# Patient Record
Sex: MALE | Race: WHITE | Employment: FULL TIME | URBAN - METROPOLITAN AREA
[De-identification: names, ages, dates, MRNs, and addresses within clinical notes are randomized per-mention and may not be internally consistent; named-entity substitution may affect disease eponyms.]

---

## 2018-08-28 ENCOUNTER — HOSPITAL ENCOUNTER (OUTPATIENT)
Dept: LAB | Age: 58
Discharge: HOME | End: 2018-08-28
Payer: COMMERCIAL

## 2018-08-28 DIAGNOSIS — R07.9 CHEST PAIN, UNSPECIFIED TYPE: ICD-10-CM

## 2018-08-28 LAB
ANION GAP SERPL CALC-SCNC: 14 MMOL/L
BASOPHILS # BLD: 0.1 K/UL (ref 0–0.2)
BASOPHILS NFR BLD: 1 % (ref 0–2)
BUN SERPL-MCNC: 20 MG/DL (ref 6–23)
CALCIUM SERPL-MCNC: 9.5 MG/DL (ref 8.3–10.4)
CHLORIDE SERPL-SCNC: 97 MMOL/L (ref 98–107)
CO2 SERPL-SCNC: 24 MMOL/L (ref 21–32)
CREAT SERPL-MCNC: 1.1 MG/DL (ref 0.8–1.5)
DIFFERENTIAL METHOD BLD: NORMAL
EOSINOPHIL # BLD: 0.2 K/UL (ref 0–0.8)
EOSINOPHIL NFR BLD: 3 % (ref 0.5–7.8)
ERYTHROCYTE [DISTWIDTH] IN BLOOD BY AUTOMATED COUNT: 13.6 %
GLUCOSE SERPL-MCNC: 106 MG/DL (ref 65–100)
HCT VFR BLD AUTO: 43 % (ref 41.1–50.3)
HGB BLD-MCNC: 13.8 G/DL (ref 13.6–17.2)
IMM GRANULOCYTES # BLD: 0 K/UL (ref 0–0.5)
IMM GRANULOCYTES NFR BLD AUTO: 0 % (ref 0–5)
INR PPP: 1
LYMPHOCYTES # BLD: 2.4 K/UL (ref 0.5–4.6)
LYMPHOCYTES NFR BLD: 36 % (ref 13–44)
MCH RBC QN AUTO: 29.9 PG (ref 26.1–32.9)
MCHC RBC AUTO-ENTMCNC: 32.1 G/DL (ref 31.4–35)
MCV RBC AUTO: 93.1 FL (ref 79.6–97.8)
MONOCYTES # BLD: 0.7 K/UL (ref 0.1–1.3)
MONOCYTES NFR BLD: 11 % (ref 4–12)
NEUTS SEG # BLD: 3.2 K/UL (ref 1.7–8.2)
NEUTS SEG NFR BLD: 48 % (ref 43–78)
NRBC # BLD: 0 K/UL (ref 0–0.2)
PLATELET # BLD AUTO: 167 K/UL (ref 150–450)
PMV BLD AUTO: 10.4 FL (ref 9.4–12.3)
POTASSIUM SERPL-SCNC: 4.5 MMOL/L (ref 3.5–5.1)
PROTHROMBIN TIME: 12.9 SEC (ref 11.5–14.5)
RBC # BLD AUTO: 4.62 M/UL (ref 4.23–5.6)
SODIUM SERPL-SCNC: 135 MMOL/L (ref 136–145)
WBC # BLD AUTO: 6.6 K/UL (ref 4.3–11.1)

## 2018-08-28 PROCEDURE — 36415 COLL VENOUS BLD VENIPUNCTURE: CPT

## 2018-08-28 PROCEDURE — 85025 COMPLETE CBC W/AUTO DIFF WBC: CPT

## 2018-08-28 PROCEDURE — 85610 PROTHROMBIN TIME: CPT

## 2018-08-28 PROCEDURE — 80048 BASIC METABOLIC PNL TOTAL CA: CPT

## 2018-08-30 NOTE — PROGRESS NOTES
Patient pre-assessment complete for [facility] poss with DR Ferrel Alpers scheduled for 18 at 9:30am, arrival time 7:30am. Patient verified using . Patient instructed to bring all home medications in labeled bottles on the day of procedure. NPO status reinforced. Patient informed to take a full dose aspirin 325mg  or 81 mg x 4 on the day of procedure. Patient instructed to HOLD metformin (last dose 18) take 1/2 insulin tonight & NO insulin, metformin or HCTZ in am . Instructed they can take all other medications excluding vitamins & supplements. Patient verbalizes understanding of all instructions & denies any questions at this time.

## 2018-08-31 ENCOUNTER — HOSPITAL ENCOUNTER (OUTPATIENT)
Dept: CARDIAC CATH/INVASIVE PROCEDURES | Age: 58
Discharge: HOME | End: 2018-08-31
Attending: INTERNAL MEDICINE | Admitting: INTERNAL MEDICINE
Payer: COMMERCIAL

## 2018-08-31 VITALS
WEIGHT: 223 LBS | HEIGHT: 65 IN | OXYGEN SATURATION: 96 % | RESPIRATION RATE: 16 BRPM | HEART RATE: 68 BPM | BODY MASS INDEX: 37.15 KG/M2 | SYSTOLIC BLOOD PRESSURE: 131 MMHG | DIASTOLIC BLOOD PRESSURE: 68 MMHG

## 2018-08-31 LAB
ATRIAL RATE: 67 BPM
CALCULATED P AXIS, ECG09: 2 DEGREES
CALCULATED R AXIS, ECG10: -2 DEGREES
CALCULATED T AXIS, ECG11: 36 DEGREES
DIAGNOSIS, 93000: NORMAL
GLUCOSE BLD STRIP.AUTO-MCNC: 207 MG/DL (ref 65–100)
P-R INTERVAL, ECG05: 158 MS
Q-T INTERVAL, ECG07: 380 MS
QRS DURATION, ECG06: 90 MS
QTC CALCULATION (BEZET), ECG08: 401 MS
VENTRICULAR RATE, ECG03: 67 BPM

## 2018-08-31 PROCEDURE — 74011000250 HC RX REV CODE- 250: Performed by: INTERNAL MEDICINE

## 2018-08-31 PROCEDURE — 74011000258 HC RX REV CODE- 258: Performed by: INTERNAL MEDICINE

## 2018-08-31 PROCEDURE — 77030019569 HC BND COMPR RAD TERU -B

## 2018-08-31 PROCEDURE — 99152 MOD SED SAME PHYS/QHP 5/>YRS: CPT

## 2018-08-31 PROCEDURE — 74011636320 HC RX REV CODE- 636/320: Performed by: INTERNAL MEDICINE

## 2018-08-31 PROCEDURE — C1894 INTRO/SHEATH, NON-LASER: HCPCS

## 2018-08-31 PROCEDURE — C1887 CATHETER, GUIDING: HCPCS

## 2018-08-31 PROCEDURE — 74011250636 HC RX REV CODE- 250/636: Performed by: INTERNAL MEDICINE

## 2018-08-31 PROCEDURE — 93571 IV DOP VEL&/PRESS C FLO 1ST: CPT

## 2018-08-31 PROCEDURE — 93458 L HRT ARTERY/VENTRICLE ANGIO: CPT

## 2018-08-31 PROCEDURE — C1769 GUIDE WIRE: HCPCS

## 2018-08-31 PROCEDURE — 77030004558 HC CATH ANGI DX SUPR TORQ CARD -A

## 2018-08-31 PROCEDURE — 82962 GLUCOSE BLOOD TEST: CPT

## 2018-08-31 PROCEDURE — 77030004559 HC CATH ANGI DX SUPT CARD -B

## 2018-08-31 PROCEDURE — 93005 ELECTROCARDIOGRAM TRACING: CPT | Performed by: INTERNAL MEDICINE

## 2018-08-31 PROCEDURE — 99153 MOD SED SAME PHYS/QHP EA: CPT

## 2018-08-31 PROCEDURE — 74011250636 HC RX REV CODE- 250/636

## 2018-08-31 RX ORDER — HYDROCODONE BITARTRATE AND ACETAMINOPHEN 5; 325 MG/1; MG/1
1 TABLET ORAL
Status: CANCELLED | OUTPATIENT
Start: 2018-08-31

## 2018-08-31 RX ORDER — SODIUM CHLORIDE 9 MG/ML
75 INJECTION, SOLUTION INTRAVENOUS CONTINUOUS
Status: CANCELLED | OUTPATIENT
Start: 2018-08-31

## 2018-08-31 RX ORDER — FENTANYL CITRATE 50 UG/ML
25-100 INJECTION, SOLUTION INTRAMUSCULAR; INTRAVENOUS
Status: DISCONTINUED | OUTPATIENT
Start: 2018-08-31 | End: 2018-08-31

## 2018-08-31 RX ORDER — HEPARIN SODIUM 10000 [USP'U]/ML
1000-9000 INJECTION, SOLUTION INTRAVENOUS; SUBCUTANEOUS
Status: DISCONTINUED | OUTPATIENT
Start: 2018-08-31 | End: 2018-08-31

## 2018-08-31 RX ORDER — DIAZEPAM 5 MG/1
5 TABLET ORAL ONCE
Status: DISCONTINUED | OUTPATIENT
Start: 2018-08-31 | End: 2018-08-31 | Stop reason: HOSPADM

## 2018-08-31 RX ORDER — LIDOCAINE HYDROCHLORIDE 10 MG/ML
20-300 INJECTION INFILTRATION; PERINEURAL
Status: DISCONTINUED | OUTPATIENT
Start: 2018-08-31 | End: 2018-08-31

## 2018-08-31 RX ORDER — SODIUM CHLORIDE 0.9 % (FLUSH) 0.9 %
5-10 SYRINGE (ML) INJECTION EVERY 8 HOURS
Status: CANCELLED | OUTPATIENT
Start: 2018-08-31

## 2018-08-31 RX ORDER — GUAIFENESIN 100 MG/5ML
81-324 LIQUID (ML) ORAL ONCE
Status: DISCONTINUED | OUTPATIENT
Start: 2018-08-31 | End: 2018-08-31 | Stop reason: HOSPADM

## 2018-08-31 RX ORDER — ASPIRIN 325 MG
325 TABLET ORAL DAILY
COMMUNITY

## 2018-08-31 RX ORDER — NALOXONE HYDROCHLORIDE 0.4 MG/ML
0.4 INJECTION, SOLUTION INTRAMUSCULAR; INTRAVENOUS; SUBCUTANEOUS AS NEEDED
Status: CANCELLED | OUTPATIENT
Start: 2018-08-31

## 2018-08-31 RX ORDER — MORPHINE SULFATE 2 MG/ML
1 INJECTION, SOLUTION INTRAMUSCULAR; INTRAVENOUS
Status: CANCELLED | OUTPATIENT
Start: 2018-08-31

## 2018-08-31 RX ORDER — ACETAMINOPHEN 325 MG/1
650 TABLET ORAL
Status: CANCELLED | OUTPATIENT
Start: 2018-08-31

## 2018-08-31 RX ORDER — SODIUM CHLORIDE 0.9 % (FLUSH) 0.9 %
5-10 SYRINGE (ML) INJECTION AS NEEDED
Status: CANCELLED | OUTPATIENT
Start: 2018-08-31

## 2018-08-31 RX ORDER — ONDANSETRON 2 MG/ML
4 INJECTION INTRAMUSCULAR; INTRAVENOUS
Status: CANCELLED | OUTPATIENT
Start: 2018-08-31 | End: 2018-09-01

## 2018-08-31 RX ORDER — MIDAZOLAM HYDROCHLORIDE 1 MG/ML
.5-2 INJECTION, SOLUTION INTRAMUSCULAR; INTRAVENOUS
Status: DISCONTINUED | OUTPATIENT
Start: 2018-08-31 | End: 2018-08-31

## 2018-08-31 RX ORDER — SODIUM CHLORIDE 9 MG/ML
75 INJECTION, SOLUTION INTRAVENOUS CONTINUOUS
Status: DISCONTINUED | OUTPATIENT
Start: 2018-08-31 | End: 2018-08-31 | Stop reason: HOSPADM

## 2018-08-31 RX ORDER — HEPARIN SODIUM 200 [USP'U]/100ML
3 INJECTION, SOLUTION INTRAVENOUS CONTINUOUS
Status: DISCONTINUED | OUTPATIENT
Start: 2018-08-31 | End: 2018-08-31

## 2018-08-31 RX ADMIN — HEPARIN SODIUM 2 ML: 10000 INJECTION, SOLUTION INTRAVENOUS; SUBCUTANEOUS at 09:51

## 2018-08-31 RX ADMIN — IOPAMIDOL 155 ML: 755 INJECTION, SOLUTION INTRAVENOUS at 10:29

## 2018-08-31 RX ADMIN — MIDAZOLAM HYDROCHLORIDE 2 MG: 1 INJECTION, SOLUTION INTRAMUSCULAR; INTRAVENOUS at 09:49

## 2018-08-31 RX ADMIN — HEPARIN SODIUM 8000 UNITS: 10000 INJECTION, SOLUTION INTRAVENOUS; SUBCUTANEOUS at 10:17

## 2018-08-31 RX ADMIN — FENTANYL CITRATE 50 MCG: 50 INJECTION, SOLUTION INTRAMUSCULAR; INTRAVENOUS at 09:49

## 2018-08-31 RX ADMIN — LIDOCAINE HYDROCHLORIDE 3 ML: 10 INJECTION, SOLUTION INFILTRATION; PERINEURAL at 09:49

## 2018-08-31 RX ADMIN — SODIUM CHLORIDE 75 ML/HR: 900 INJECTION, SOLUTION INTRAVENOUS at 09:15

## 2018-08-31 RX ADMIN — HEPARIN SODIUM 3 ML/HR: 200 INJECTION, SOLUTION INTRAVENOUS at 09:44

## 2018-08-31 NOTE — PROCEDURES
Brief Cardiac Procedure Note    Patient: Kesha Salazar MRN: [id number]  SSN: [id number]    YOB: 1960  Age: 63 y.o. Sex: male      Date of Procedure: 8/31/2018     Pre-procedure Diagnosis: Atypical Angina    Post-procedure Diagnosis: Coronary Artery Disease    Reason for Procedure: Worsening Angina    Procedure: Left Heart Catheterization    Brief Description of Procedure: LHC via R radial artery, iFR D1    Performed By: Zuleima Caballero MD     Assistants: None    Anesthesia: Moderate Sedation    Estimated Blood Loss: Less than 10 mL      Specimens: None    Implants: None    Findings: LM normal, LAD 50% distal (small vessel sub 2.0 mm distally), D1 70% ostial, iFR 0.96, Circ luminal irregs, RCA ectatic without significant stenosis (dominant) LVEF 55-60%, LVEDP 20 mmHg    Complications: None    Recommendations: Continue medical therapy.     Signed By: Zuleima Caballero MD     August 31, 2018

## 2018-08-31 NOTE — DISCHARGE INSTRUCTIONS
HEART CATHETERIZATION/ANGIOGRAPHY DISCHARGE INSTRUCTIONS    1. Check puncture site frequently for swelling or bleeding. If there is any bleeding, lie down and apply pressure over the area with a clean towel or washcloth. Notify your doctor for any redness, swelling, drainage, or oozing from the puncture site. Notify your doctor for any fever or chills. 2. If the extremity becomes cold, numb, or painful call [facility] Cardiology at [phone].  3. Activity should be limited for the next 48 hours. Climb stairs as little as possible and avoid any stooping, bending, or strenuous activity for 48 hours. No heavy lifting (anything over 10 pounds) for 3 days. 4. You may resume your usual diet. Drink more fluids than usual.  5. Have a responsible person drive you home and stay with you for at least 24 hours after your heart catheterization/angiography. 6. You may remove bandage from your Right wrist in 24 hours. You may shower in 24 hours. No tub baths, hot tubs, or swimming for 1 week. Do not place any lotions, creams, powders, or ointments over puncture site for 1 week. You may place a clean band-aid over the puncture site each day for 5 days. Change daily. I have read the above instructions and have had the opportunity to ask questions.       Patient: ________________________   Date: 8/31/2018    Witness: _______________________   Date: 8/31/2018

## 2018-08-31 NOTE — PROGRESS NOTES
TRANSFER - OUT REPORT:    Verbal report given to Lawanda Rubinstein, RN (name) on Berhane Anne  being transferred to [address] [place] [address] (unit) for routine progression of care       Report consisted of patients Situation, Background, Assessment and   Recommendations(SBAR). Information from the following report(s) SBAR was reviewed with the receiving nurse. Lines:   Peripheral IV 08/31/18 Left Antecubital (Active)       Peripheral IV 08/31/18 Right Antecubital (Active)        Opportunity for questions and clarification was provided. Patient transported with:   Tech         Pt had LHC via [address] [place] iFR utilized x 1 vessel. Site closed with TR band and 12mL @ 1030. Pt received Versed 2mg IV, Fentanyl 50mcg IV, and Heparin 8000units IV.

## 2018-08-31 NOTE — PROGRESS NOTES
Patient received to [address] [place] [address] room # 10  Ambulatory from [facility]. Patient scheduled for [facility] today with Dr Joann Giraldo. Procedure reviewed & questions answered, voiced good understanding consent obtained & placed on chart. All medications and medical history reviewed. Will prep patient per orders. Patient & family updated on plan of care. The patient has a fraility score of 3-MANAGING WELL, based on patient A&Ox3, patient able to ambulate to room without difficulty, patient able to perform most ADL's independently.

## 2018-08-31 NOTE — IP AVS SNAPSHOT
[place] 
 
 
 [address] [facility] [address] [facility] 
[phone] Patient: Junaid Garcia MRN: [id number] VLI:[id number] Discharge Summary 8/31/2018 Junaid Garcia MRN[de-identified]  [id number] Admission Information Provider Pager Service Admission Date Expected D/C Date Baron Weston MD  CARDIAC CATH LAB 8/31/2018 Actual LOS Patient Class 0 days OUTPATIENT Follow-up Information Follow up With Details Comments Contact Info Kiki Camp MD On 9/14/2018 Follow-up with  @ 12:30 in the [place] office [address] [facility] Cardiology [facility] [address] 
[phone] My Medications ASK your physician about these medications Instructions Each Dose to Equal  
 Morning Noon Evening Bedtime  
 aspirin 325 mg tablet Commonly known as:  ASPIRIN Take 325 mg by mouth daily. 325 mg  
    
   
   
   
  
 atenolol 50 mg tablet Commonly known as:  TENORMIN Take 1 Tab by mouth daily. 50 mg  
    
   
   
   
  
 atorvastatin 40 mg tablet Commonly known as:  LIPITOR Take 1 Tab by mouth nightly. 40 mg  
    
   
   
   
  
 dulaglutide 1.5 mg/0.5 mL sub-q pen Commonly known as:  TRULICITY  
   
 0.5 mL by SubCUTAneous route every seven (7) days. 1.5 mg  
    
   
   
   
  
 hydroCHLOROthiazide 25 mg tablet Commonly known as:  HYDRODIURIL Take 1 Tab by mouth daily. 25 mg  
    
   
   
   
  
 insulin degludec 200 unit/mL (3 mL) Inpn Commonly known as:  TRESIBA FLEXTOUCH U-200  
   
 50 Units by SubCUTAneous route daily. 50 Units IRON HIGH POTENCY 240 mg (27 mg iron) tablet Generic drug:  ferrous gluconate Take 240 mg by mouth daily. 240 mg  
    
   
   
   
  
 lansoprazole 30 mg capsule Commonly known as:  PREVACID Take 1 Cap by mouth daily.   
 30 mg  
    
   
   
   
  
 lisinopril 40 mg tablet Commonly known as:  Thersia Kubas Take 1 Tab by mouth daily. 40 mg  
    
   
   
   
  
 metFORMIN 1,000 mg tablet Commonly known as:  GLUCOPHAGE Notes to Patient:  HOLD FOR 48 HOURS. .. RESTART ON 9/3/18 Take 1 Tab by mouth two (2) times a day. 1000 mg  
    
   
   
   
  
 metoclopramide HCl 10 mg tablet Commonly known as:  REGLAN Take 1 Tab by mouth four (4) times daily. 10 mg  
    
   
   
   
  
  
  
  
 
  
General Information Please provide this summary of care documentation to your next provider. Allergies Unspecified:  Latex, Natural Rubber;  Labetalol; Levemir [Insulin Detemir] Current Immunizations  Reviewed on 3/13/2018 Name Date Influenza Vaccine 10/3/2017, 10/5/2015 Discharge Instructions Discharge Instructions HEART CATHETERIZATION/ANGIOGRAPHY DISCHARGE INSTRUCTIONS 1. Check puncture site frequently for swelling or bleeding. If there is any bleeding, lie down and apply pressure over the area with a clean towel or washcloth. Notify your doctor for any redness, swelling, drainage, or oozing from the puncture site. Notify your doctor for any fever or chills. 2. If the extremity becomes cold, numb, or painful call [facility] Cardiology at [phone]. 
3. Activity should be limited for the next 48 hours. Climb stairs as little as possible and avoid any stooping, bending, or strenuous activity for 48 hours. No heavy lifting (anything over 10 pounds) for 3 days. 4. You may resume your usual diet. Drink more fluids than usual. 
5. Have a responsible person drive you home and stay with you for at least 24 hours after your heart catheterization/angiography. 6. You may remove bandage from your Right wrist in 24 hours. You may shower in 24 hours. No tub baths, hot tubs, or swimming for 1 week.  Do not place any lotions, creams, powders, or ointments over puncture site for 1 week. You may place a clean band-aid over the puncture site each day for 5 days. Change daily. I have read the above instructions and have had the opportunity to ask questions. Patient: ________________________   Date: 8/31/2018 Witness: _______________________   Date: 8/31/2018 Discharge Orders None  
  
` Patient Signature:  ____________________________________________________________ Date:  ____________________________________________________________  
  
 [facility] Provider Signature:  ____________________________________________________________ Date:  ____________________________________________________________

## 2018-08-31 NOTE — PROGRESS NOTES
Report received from Oscar Brizuela. Procedural findings communicated. Intra procedural  medication administration reviewed. Progression of care discussed.      Patient received into [address] [facility] 6 post sheath removal.     Access site without bleeding or swelling yes    Dressing dry and intact yes    Patient instructed to limit movement to right upper extremity    Routine post procedural vital signs and site assessment initiated yes

## 2018-08-31 NOTE — PROGRESS NOTES
Discharge instructions given. TR band removed with quick clot, 4x4 and tegaderm applied to site. No bleeding or swelling noted. Sling placed to right arm.

## 2018-08-31 NOTE — PROCEDURES
[address] [facility] CATH    Magdalena Vasquez  MR#: [id number]  : 1960  ACCOUNT #: [de-identified]   DATE OF SERVICE: 2018    REFERRING PHYSICIAN:  Brittany Locke    INDICATIONS:  The patient is a 60-year-old male with a history of atypical chest pain. He underwent a CT calcium score that was greater than 3000. He was referred to the heart catheterization lab to evaluate his coronary anatomy. BLOOD LOSS:  Less than 5 mL. SEDATION:  The patient was given 2 mg of Versed and 50 mcg of fentanyl and monitored for conscious sedation beginning at 9:45 and ending at 10:27 by nurse, Nicole Sharif. ASSISTANT:  None. SPECIMENS:  None. COMPLICATIONS:  None. A preprocedure timeout was completed. Mallampati score 2. ASA score 2. DESCRIPTION OF PROCEDURE:  After informed consent, the patient was prepped and draped in the usual sterile fashion. The right wrist was infiltrated with lidocaine. The right radial artery was accessed via the modified Seldinger technique with a 6-Cypriot sheath. A total of 155 mL of Visipaque contrast were used for the entire procedure. A Terumo band was used for hemostasis. CATHETERS USED:  Included a 6-Cypriot JL3.5, 6-Cypriot JR5, 6-Cypriot angled pigtail catheter and 6-Cypriot CL3.5 guide. FINDINGS:  1. Left ventricle:  Left ventricular ejection fraction is estimated at 55-60% with normal wall motion. 2.  LVEDP was measured at 20 mmHg. 3.  Left main was normal.  4.  Left anterior descending artery had some luminal irregularities and was heavily calcified. It had a distal stenosis of approximately 50% and a sub 2 mm vessel. It gave rise to several diagonals, the first one of which had approximately 70-80% ostial stenosis. An IFR measurement was done across the stenosis to determine if it was hemodynamically significant and the measurement was 0.96, which indicates nonhemodynamic significance.   5.  Circumflex: Circumflex artery had luminal irregularities but was free of focal stenoses. 6.  Right coronary artery was ectatic and dilated without focal stenosis. CONCLUSION:  This is a 52-year-old male with a known history of coronary artery disease by CT calcium score who presents for angiography, which showed non-hemodynamically significant coronary artery disease. RECOMMENDATIONS:  We will have him continue his current regimen and follow up with Dr. Michaelle Tenorio in the next 2-4 weeks for ongoing treatment of his coronary artery disease and aggressive management of his lipids.       MD LARS Schaffer / [place]  D: 08/31/2018 10:43     T: 08/31/2018 11:45  JOB #: [id number]

## 2019-05-31 PROBLEM — E66.01 SEVERE OBESITY (HCC): Status: ACTIVE | Noted: 2019-05-31

## 2022-03-19 PROBLEM — E66.01 SEVERE OBESITY (HCC): Status: ACTIVE | Noted: 2019-05-31

## 2022-09-09 ENCOUNTER — TELEPHONE (OUTPATIENT)
Dept: NUTRITION | Age: 62
End: 2022-09-09

## 2022-09-09 NOTE — TELEPHONE ENCOUNTER
Nutrition Counseling: Contacted pt regarding referral. Pt stated that he will not schedule. See notes documented in Nutrition Counseling Referral for details. No further follow-up contact from pt. Will close referral for this office and notify referring provider.     [address] [facility]  [phone]

## 2024-02-05 ENCOUNTER — APPOINTMENT (RX ONLY)
Dept: URBAN - METROPOLITAN AREA CLINIC 330 | Facility: CLINIC | Age: 64
Setting detail: DERMATOLOGY
End: 2024-02-05

## 2024-02-05 DIAGNOSIS — L81.8 OTHER SPECIFIED DISORDERS OF PIGMENTATION: ICD-10-CM

## 2024-02-05 DIAGNOSIS — L30.4 ERYTHEMA INTERTRIGO: ICD-10-CM

## 2024-02-05 DIAGNOSIS — L82.1 OTHER SEBORRHEIC KERATOSIS: ICD-10-CM

## 2024-02-05 PROCEDURE — ? COUNSELING

## 2024-02-05 PROCEDURE — ? TREATMENT REGIMEN

## 2024-02-05 PROCEDURE — ? ADDITIONAL NOTES

## 2024-02-05 PROCEDURE — 99203 OFFICE O/P NEW LOW 30 MIN: CPT

## 2024-02-05 ASSESSMENT — LOCATION SIMPLE DESCRIPTION DERM
LOCATION SIMPLE: LEFT THIGH
LOCATION SIMPLE: ABDOMEN
LOCATION SIMPLE: RIGHT THIGH

## 2024-02-05 ASSESSMENT — LOCATION ZONE DERM
LOCATION ZONE: LEG
LOCATION ZONE: TRUNK

## 2024-02-05 ASSESSMENT — LOCATION DETAILED DESCRIPTION DERM
LOCATION DETAILED: LEFT ANTERIOR PROXIMAL THIGH
LOCATION DETAILED: PERIUMBILICAL SKIN
LOCATION DETAILED: RIGHT ANTERIOR PROXIMAL THIGH

## 2024-02-05 NOTE — PROCEDURE: ADDITIONAL NOTES
Detail Level: Detailed
Render Risk Assessment In Note?: no
Additional Notes: Offered LN2, pt declines at this time
Detail Level: Simple
Additional Notes: Area previously treated with Ketoconazole cream. He is feeling better. Zeasorb powder recommended to help moisture management under the belly and in groin folds.

## 2025-04-18 ENCOUNTER — HOSPITAL ENCOUNTER (EMERGENCY)
Age: 65
Discharge: LEFT BEFORE DISCHARGE | End: 2025-04-18
Payer: COMMERCIAL

## 2025-04-18 VITALS
TEMPERATURE: 97.5 F | RESPIRATION RATE: 16 BRPM | HEART RATE: 82 BPM | SYSTOLIC BLOOD PRESSURE: 124 MMHG | DIASTOLIC BLOOD PRESSURE: 84 MMHG

## 2025-04-18 LAB
GLUCOSE BLD STRIP.AUTO-MCNC: 245 MG/DL (ref 65–100)
SERVICE CMNT-IMP: ABNORMAL

## 2025-04-18 PROCEDURE — 82962 GLUCOSE BLOOD TEST: CPT

## 2025-04-19 NOTE — PROGRESS NOTES
Patient stated he placed a new dexacom sensor on today.  Dexacom no sensing.    asymptomatic decided to leave and change sensor when he got home.   no longer wanted to be seen.

## 2025-04-19 NOTE — ED TRIAGE NOTES
Patient ambulatory into ED w/slow gait w/cane.  Patient is w/his brother.  Patient states is glucose machine may be broken states \"0\" at home.  Patient bgl 245 in triage.  Patient brother states patient has trouble seeing and has a stroke in the past.